# Patient Record
(demographics unavailable — no encounter records)

---

## 2025-02-10 NOTE — ASSESSMENT
[FreeTextEntry1] : ======================================================================================= 1. Dyslipidemia:  (09/05/24): possible familial hyperlipidemia:   - discussed therapeutic lifestyle changes to promote improved lipid metabolism  - will initiate rosuvastatin 20mg po qd (possible adverse effects of new medications discussed)  - will consider sending to the cardiogenomics team to rule out familial hyperlipidemia in the future (she wishes to defer at this time)   - will check repeat lipid profile next visit

## 2025-02-10 NOTE — HISTORY OF PRESENT ILLNESS
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 Her lab work from 09/05/24 revealed an LDL of 184, triglycerides of 185, and an HDL of 62.  Of note her father also has elevated cholesterol.  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  She denies chest pain or VELÁSQUEZ and feels generally well.

## 2025-02-10 NOTE — REASON FOR VISIT
[Other: ____] : [unfilled] [FreeTextEntry1] : ======================================================================= Diagnostic Tests: -------------------------------------------------------------- EC/10/25: sinus rhythm, normal ECG.

## 2025-06-23 NOTE — ASSESSMENT
[FreeTextEntry1] : ======================================================================================= 1. Dyslipidemia:  (09/05/24): possible familial hyperlipidemia:   - discussed therapeutic lifestyle changes to promote improved lipid metabolism  - continue rosuvastatin 20mg po qd   - will consider sending to the cardiac genetics team to rule out familial hyperlipidemia in the future (she wishes to defer at this time)   - will check repeat lipid profile today

## 2025-06-23 NOTE — HISTORY OF PRESENT ILLNESS
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 Her lab work from 09/05/24 revealed an LDL of 184, triglycerides of 185, and an HDL of 62.  Of note her father also has elevated cholesterol.  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  She denies chest pain or VELÁSQUEZ and feels generally well.  She is tolerating rosuvastatin well.